# Patient Record
Sex: FEMALE | HISPANIC OR LATINO
[De-identification: names, ages, dates, MRNs, and addresses within clinical notes are randomized per-mention and may not be internally consistent; named-entity substitution may affect disease eponyms.]

---

## 2017-12-31 PROBLEM — Z00.00 ENCOUNTER FOR PREVENTIVE HEALTH EXAMINATION: Status: ACTIVE | Noted: 2017-12-31

## 2018-01-30 ENCOUNTER — APPOINTMENT (OUTPATIENT)
Dept: ENDOCRINOLOGY | Facility: CLINIC | Age: 58
End: 2018-01-30
Payer: MEDICARE

## 2018-01-30 VITALS
OXYGEN SATURATION: 98 % | WEIGHT: 150 LBS | DIASTOLIC BLOOD PRESSURE: 80 MMHG | SYSTOLIC BLOOD PRESSURE: 120 MMHG | BODY MASS INDEX: 30.24 KG/M2 | RESPIRATION RATE: 16 BRPM | HEART RATE: 85 BPM | HEIGHT: 59 IN

## 2018-01-30 DIAGNOSIS — Z78.9 OTHER SPECIFIED HEALTH STATUS: ICD-10-CM

## 2018-01-30 DIAGNOSIS — Z83.3 FAMILY HISTORY OF DIABETES MELLITUS: ICD-10-CM

## 2018-01-30 PROCEDURE — 99214 OFFICE O/P EST MOD 30 MIN: CPT

## 2018-03-28 ENCOUNTER — APPOINTMENT (OUTPATIENT)
Dept: ENDOCRINOLOGY | Facility: CLINIC | Age: 58
End: 2018-03-28
Payer: MEDICARE

## 2018-03-28 VITALS
BODY MASS INDEX: 31.45 KG/M2 | TEMPERATURE: 98.1 F | SYSTOLIC BLOOD PRESSURE: 122 MMHG | DIASTOLIC BLOOD PRESSURE: 60 MMHG | HEIGHT: 59 IN | HEART RATE: 84 BPM | WEIGHT: 156 LBS | RESPIRATION RATE: 16 BRPM | OXYGEN SATURATION: 97 %

## 2018-03-28 LAB — GLUCOSE BLDC GLUCOMTR-MCNC: 202

## 2018-03-28 PROCEDURE — 99214 OFFICE O/P EST MOD 30 MIN: CPT | Mod: 25

## 2018-03-28 PROCEDURE — 82962 GLUCOSE BLOOD TEST: CPT

## 2018-03-30 ENCOUNTER — RX RENEWAL (OUTPATIENT)
Age: 58
End: 2018-03-30

## 2018-04-16 ENCOUNTER — RX RENEWAL (OUTPATIENT)
Age: 58
End: 2018-04-16

## 2018-06-19 ENCOUNTER — APPOINTMENT (OUTPATIENT)
Dept: ENDOCRINOLOGY | Facility: CLINIC | Age: 58
End: 2018-06-19
Payer: MEDICARE

## 2018-06-19 VITALS
BODY MASS INDEX: 29.23 KG/M2 | SYSTOLIC BLOOD PRESSURE: 118 MMHG | DIASTOLIC BLOOD PRESSURE: 72 MMHG | WEIGHT: 145 LBS | HEIGHT: 59 IN | OXYGEN SATURATION: 98 % | HEART RATE: 84 BPM | RESPIRATION RATE: 16 BRPM

## 2018-06-19 LAB — GLUCOSE BLDC GLUCOMTR-MCNC: 102

## 2018-06-19 PROCEDURE — 99214 OFFICE O/P EST MOD 30 MIN: CPT | Mod: 25

## 2018-06-19 PROCEDURE — 82962 GLUCOSE BLOOD TEST: CPT

## 2018-09-18 ENCOUNTER — APPOINTMENT (OUTPATIENT)
Dept: ENDOCRINOLOGY | Facility: CLINIC | Age: 58
End: 2018-09-18
Payer: MEDICARE

## 2018-09-18 VITALS
TEMPERATURE: 98.8 F | DIASTOLIC BLOOD PRESSURE: 68 MMHG | SYSTOLIC BLOOD PRESSURE: 105 MMHG | RESPIRATION RATE: 16 BRPM | WEIGHT: 150 LBS | BODY MASS INDEX: 30.24 KG/M2 | HEART RATE: 73 BPM | OXYGEN SATURATION: 98 % | HEIGHT: 59 IN

## 2018-09-18 LAB — GLUCOSE BLDC GLUCOMTR-MCNC: 134

## 2018-09-18 PROCEDURE — 99214 OFFICE O/P EST MOD 30 MIN: CPT | Mod: 25

## 2018-09-18 PROCEDURE — 82962 GLUCOSE BLOOD TEST: CPT

## 2018-09-18 RX ORDER — INSULIN GLARGINE 100 [IU]/ML
100 INJECTION, SOLUTION SUBCUTANEOUS
Qty: 6 | Refills: 3 | Status: COMPLETED | COMMUNITY
End: 2018-09-18

## 2018-09-18 RX ORDER — DEXAMETHASONE 1 MG/1
1 TABLET ORAL
Qty: 1 | Refills: 1 | Status: COMPLETED | COMMUNITY
Start: 2018-03-28 | End: 2018-09-18

## 2018-12-21 ENCOUNTER — APPOINTMENT (OUTPATIENT)
Dept: ENDOCRINOLOGY | Facility: CLINIC | Age: 58
End: 2018-12-21
Payer: MEDICARE

## 2018-12-21 VITALS
SYSTOLIC BLOOD PRESSURE: 104 MMHG | RESPIRATION RATE: 16 BRPM | DIASTOLIC BLOOD PRESSURE: 64 MMHG | WEIGHT: 148 LBS | HEART RATE: 89 BPM | BODY MASS INDEX: 29.84 KG/M2 | HEIGHT: 59 IN | TEMPERATURE: 97.9 F | OXYGEN SATURATION: 96 %

## 2018-12-21 LAB — GLUCOSE BLDC GLUCOMTR-MCNC: 93

## 2018-12-21 PROCEDURE — 82962 GLUCOSE BLOOD TEST: CPT

## 2018-12-21 PROCEDURE — 99214 OFFICE O/P EST MOD 30 MIN: CPT | Mod: 25

## 2019-01-28 ENCOUNTER — RX RENEWAL (OUTPATIENT)
Age: 59
End: 2019-01-28

## 2019-01-28 RX ORDER — CANAGLIFLOZIN 300 MG/1
300 TABLET, FILM COATED ORAL
Qty: 90 | Refills: 3 | Status: COMPLETED | COMMUNITY
End: 2019-01-28

## 2019-04-23 ENCOUNTER — APPOINTMENT (OUTPATIENT)
Dept: ENDOCRINOLOGY | Facility: CLINIC | Age: 59
End: 2019-04-23
Payer: MEDICARE

## 2019-04-23 VITALS
BODY MASS INDEX: 30.04 KG/M2 | HEART RATE: 78 BPM | TEMPERATURE: 98.3 F | HEIGHT: 59 IN | RESPIRATION RATE: 16 BRPM | WEIGHT: 149 LBS | DIASTOLIC BLOOD PRESSURE: 66 MMHG | SYSTOLIC BLOOD PRESSURE: 106 MMHG | OXYGEN SATURATION: 96 %

## 2019-04-23 LAB — GLUCOSE BLDC GLUCOMTR-MCNC: 168

## 2019-04-23 PROCEDURE — 82962 GLUCOSE BLOOD TEST: CPT

## 2019-04-23 PROCEDURE — 99214 OFFICE O/P EST MOD 30 MIN: CPT | Mod: 25

## 2019-04-23 NOTE — DATA REVIEWED
[FreeTextEntry1] : The FBS and HbA1c indicates improved control. The renal function is fine. The LDL-C is good

## 2019-04-23 NOTE — PHYSICAL EXAM
[Alert] : alert [No Acute Distress] : no acute distress [Normal Sclera/Conjunctiva] : normal sclera/conjunctiva [PERRL] : pupils equal, round and reactive to light [Normal Outer Ear/Nose] : the ears and nose were normal in appearance [Normal Hearing] : hearing was normal [No Neck Mass] : no neck mass was observed [Thyroid Not Enlarged] : the thyroid was not enlarged [No Respiratory Distress] : no respiratory distress [Normal Rate and Effort] : normal respiratory rhythm and effort [Normal PMI] : the apical impulse was normal [Normal Rate] : heart rate was normal  [Carotids Normal] : carotid pulses were normal with no bruits [Pedal Pulses Normal] : the pedal pulses are present [No Stigmata of Cushings Syndrome] : no stigmata of cushings syndrome [No Rash] : no rash [No Skin Lesions] : no skin lesions [No Motor Deficits] : the motor exam was normal [No Sensory Deficits] : the sensory exam was normal to light touch and pinprick [Normal Sensation on Monofilament Testing] : normal sensation on monofilament testing of lower extremities

## 2019-04-23 NOTE — HISTORY OF PRESENT ILLNESS
[FreeTextEntry1] : Patient feels well. Denies polydipsia and polyuria. Denies chest pain, SOB, ankle edema. Patient's weight is unchanged. She denies numbness and tingling sensation on extremities. Her blood sugars at home are fine . They range from 90 to 120 mg/dl. At the lab the FBS was 134 mg/dl and the HbA1c 7.7 %, the HbA1c has improved from 8%. Following the diet and/or exercising properly. She has seen seen  the Ophthalmologist recently, not see the Podiatrist recently or the Cardiologist recently\par

## 2019-04-23 NOTE — ASSESSMENT
[FreeTextEntry1] : The diabetic control is improving\par Advised to continue following the diet and exercise\par Will continue the same treatment\par Informed that the HbA1c should be below 7%.\par The hyperlipidemia is well controlled except for the HDL-C that is low

## 2019-08-20 ENCOUNTER — APPOINTMENT (OUTPATIENT)
Dept: ENDOCRINOLOGY | Facility: CLINIC | Age: 59
End: 2019-08-20
Payer: MEDICARE

## 2019-08-20 VITALS
HEART RATE: 89 BPM | OXYGEN SATURATION: 97 % | WEIGHT: 150 LBS | BODY MASS INDEX: 30.24 KG/M2 | HEIGHT: 59 IN | TEMPERATURE: 97.9 F | DIASTOLIC BLOOD PRESSURE: 66 MMHG | RESPIRATION RATE: 16 BRPM | SYSTOLIC BLOOD PRESSURE: 107 MMHG

## 2019-08-20 LAB — GLUCOSE BLDC GLUCOMTR-MCNC: 76

## 2019-08-20 PROCEDURE — G0108 DIAB MANAGE TRN  PER INDIV: CPT

## 2019-08-20 PROCEDURE — 99214 OFFICE O/P EST MOD 30 MIN: CPT | Mod: 25

## 2019-08-20 PROCEDURE — 82962 GLUCOSE BLOOD TEST: CPT

## 2019-08-20 NOTE — HISTORY OF PRESENT ILLNESS
[FreeTextEntry1] : The patient feels well but she is under stress at home. She is unable to lose weight. The HbA1c has increased. The renal function is fine. Denies chest pain or palpitations or SOB. No urinary symptoms. She has seen the eye doctors, not seen the Podiatrist yet.

## 2019-08-20 NOTE — ASSESSMENT
[FreeTextEntry1] : The diabetic control has deteriorated\par She could not see the Nutritionist because she does not take her insurance.\par Advised to follow the diet and exercise\par She is already taking the best medications available\par To see Nutritionist in the second floor

## 2019-08-20 NOTE — PHYSICAL EXAM
[Alert] : alert [No Acute Distress] : no acute distress [Normal Sclera/Conjunctiva] : normal sclera/conjunctiva [PERRL] : pupils equal, round and reactive to light [Normal Outer Ear/Nose] : the ears and nose were normal in appearance [Normal Hearing] : hearing was normal [No Neck Mass] : no neck mass was observed [Thyroid Not Enlarged] : the thyroid was not enlarged [No Respiratory Distress] : no respiratory distress [Normal Rate and Effort] : normal respiratory rhythm and effort [Normal Rate] : heart rate was normal  [Normal PMI] : the apical impulse was normal [Carotids Normal] : carotid pulses were normal with no bruits [Pedal Pulses Normal] : the pedal pulses are present [No Stigmata of Cushings Syndrome] : no stigmata of cushings syndrome [No Rash] : no rash [No Skin Lesions] : no skin lesions [No Motor Deficits] : the motor exam was normal [No Sensory Deficits] : the sensory exam was normal to light touch and pinprick [Normal Sensation on Monofilament Testing] : normal sensation on monofilament testing of lower extremities

## 2019-12-13 ENCOUNTER — APPOINTMENT (OUTPATIENT)
Dept: ENDOCRINOLOGY | Facility: CLINIC | Age: 59
End: 2019-12-13
Payer: MEDICARE

## 2019-12-13 VITALS
HEIGHT: 59 IN | RESPIRATION RATE: 16 BRPM | OXYGEN SATURATION: 97 % | HEART RATE: 82 BPM | DIASTOLIC BLOOD PRESSURE: 59 MMHG | TEMPERATURE: 98.3 F | WEIGHT: 154 LBS | SYSTOLIC BLOOD PRESSURE: 104 MMHG | BODY MASS INDEX: 31.04 KG/M2

## 2019-12-13 LAB — GLUCOSE BLDC GLUCOMTR-MCNC: 124

## 2019-12-13 PROCEDURE — 82962 GLUCOSE BLOOD TEST: CPT

## 2019-12-13 PROCEDURE — 99214 OFFICE O/P EST MOD 30 MIN: CPT | Mod: 25

## 2019-12-13 NOTE — HISTORY OF PRESENT ILLNESS
[FreeTextEntry1] : Patient feels well , she is asymptomatic. Her weight has increased. She is exercising regularly and following the diet. Her blood glucose at home are well controlled, they are usually around 100 mg/dl. The FBS in the laboratory was 115 mg/dl and the HbA1c was 7.5 %, improved since last visit. The renal function is within normal limits, the microalbumin in the urine was normal. The lipid panel was within normal limits. She denies low blood glucose during the night. She denies chest pain, or SOB. Denies numbness, tingling or burning sensation on her extremities. Taking her medications regularly. She has seen the Ophthalmologist recently. She has not seen Podiatrist recently. She has not seen the Cardiologist recently, she saw him 2018.\par

## 2019-12-13 NOTE — ASSESSMENT
[FreeTextEntry1] : The diabetes has improved\par The patient is not losing weight\par Advised to follow a low CHO, low fat diet\par Advised to exercisexercise\par The renal function is fine\par Will continue the same treatment\par

## 2019-12-13 NOTE — DATA REVIEWED
[FreeTextEntry1] : The FBS and HbA1c have improved. The renal function is fine. The lipid panel is fine except for the HDL-C that is low

## 2019-12-13 NOTE — PHYSICAL EXAM
[Alert] : alert [No Acute Distress] : no acute distress [Normal Sclera/Conjunctiva] : normal sclera/conjunctiva [PERRL] : pupils equal, round and reactive to light [Normal Outer Ear/Nose] : the ears and nose were normal in appearance [No Neck Mass] : no neck mass was observed [Normal Hearing] : hearing was normal [Thyroid Not Enlarged] : the thyroid was not enlarged [No Respiratory Distress] : no respiratory distress [Normal Rate and Effort] : normal respiratory rhythm and effort [Normal Rate] : heart rate was normal  [Normal PMI] : the apical impulse was normal [Pedal Pulses Normal] : the pedal pulses are present [Carotids Normal] : carotid pulses were normal with no bruits [No Stigmata of Cushings Syndrome] : no stigmata of cushings syndrome [No Skin Lesions] : no skin lesions [No Rash] : no rash [No Sensory Deficits] : the sensory exam was normal to light touch and pinprick [No Motor Deficits] : the motor exam was normal [Normal Sensation on Monofilament Testing] : normal sensation on monofilament testing of lower extremities

## 2020-04-27 ENCOUNTER — APPOINTMENT (OUTPATIENT)
Dept: ENDOCRINOLOGY | Facility: CLINIC | Age: 60
End: 2020-04-27

## 2020-08-25 ENCOUNTER — APPOINTMENT (OUTPATIENT)
Dept: ENDOCRINOLOGY | Facility: CLINIC | Age: 60
End: 2020-08-25

## 2020-10-25 ENCOUNTER — RX RENEWAL (OUTPATIENT)
Age: 60
End: 2020-10-25

## 2021-03-24 ENCOUNTER — APPOINTMENT (OUTPATIENT)
Dept: ENDOCRINOLOGY | Facility: CLINIC | Age: 61
End: 2021-03-24
Payer: MEDICARE

## 2021-03-24 VITALS
SYSTOLIC BLOOD PRESSURE: 115 MMHG | HEIGHT: 59 IN | TEMPERATURE: 98 F | BODY MASS INDEX: 31.04 KG/M2 | OXYGEN SATURATION: 97 % | DIASTOLIC BLOOD PRESSURE: 67 MMHG | WEIGHT: 154 LBS | RESPIRATION RATE: 16 BRPM | HEART RATE: 81 BPM

## 2021-03-24 LAB
GLUCOSE BLDC GLUCOMTR-MCNC: 138
HBA1C MFR BLD HPLC: 7.8

## 2021-03-24 PROCEDURE — 82962 GLUCOSE BLOOD TEST: CPT

## 2021-03-24 PROCEDURE — 83036 HEMOGLOBIN GLYCOSYLATED A1C: CPT | Mod: QW

## 2021-03-24 PROCEDURE — 99214 OFFICE O/P EST MOD 30 MIN: CPT

## 2021-03-24 PROCEDURE — 99072 ADDL SUPL MATRL&STAF TM PHE: CPT

## 2021-03-24 RX ORDER — EMPAGLIFLOZIN 25 MG/1
25 TABLET, FILM COATED ORAL DAILY
Qty: 90 | Refills: 3 | Status: COMPLETED | COMMUNITY
Start: 2019-01-28 | End: 2021-03-24

## 2021-03-24 NOTE — HISTORY OF PRESENT ILLNESS
[FreeTextEntry1] : Patient feels well , she is asymptomatic. Her weight has not changed. She is exercising regularly and following the diet. Her blood glucose at home are well controlled, they are usually around 105 mg/dl. She denies low blood glucose during the night. She denies chest pain, or SOB. Denies numbness, tingling or burning sensation on her extremities. Taking her medications regularly. She has seen the Ophthalmologist recently. She has seen Podiatrist recently. She has not seen the Cardiologist recently. Her PCP stopped the Jardiance because of vaginitis and the Victoza due to the Diabetic retinopathy. The medication were stopped 2 months ago. She had blood tests with Dr. Rhodes but her phone is busy all the time.\par

## 2021-03-24 NOTE — ASSESSMENT
[FreeTextEntry1] : The diabetes seems to be well controlled\par The HbA1c was 7.8%\par Patient was slightly hypoglycemic, she was give apple juice\par Before the patient left the BS was 137 mg/dl\par No recent blood test\par Will alfaro the blood tests today\par She will call me next week for results

## 2021-03-25 LAB
ALBUMIN SERPL ELPH-MCNC: 4.5 G/DL
ALP BLD-CCNC: 62 U/L
ALT SERPL-CCNC: 11 U/L
ANION GAP SERPL CALC-SCNC: 11 MMOL/L
AST SERPL-CCNC: 19 U/L
BILIRUB DIRECT SERPL-MCNC: 0.1 MG/DL
BILIRUB INDIRECT SERPL-MCNC: 0.2 MG/DL
BILIRUB SERPL-MCNC: 0.3 MG/DL
BUN SERPL-MCNC: 12 MG/DL
CALCIUM SERPL-MCNC: 9.7 MG/DL
CHLORIDE SERPL-SCNC: 101 MMOL/L
CHOLEST SERPL-MCNC: 116 MG/DL
CO2 SERPL-SCNC: 28 MMOL/L
CREAT SERPL-MCNC: 0.61 MG/DL
CREAT SPEC-SCNC: 224 MG/DL
ESTIMATED AVERAGE GLUCOSE: 194 MG/DL
FRUCTOSAMINE SERPL-MCNC: 304 UMOL/L
GLUCOSE BLDC GLUCOMTR-MCNC: 71
GLUCOSE BS SERPL-MCNC: 155 MG/DL
GLUCOSE SERPL-MCNC: 163 MG/DL
HBA1C MFR BLD HPLC: 8.4 %
HDLC SERPL-MCNC: 51 MG/DL
LDLC SERPL CALC-MCNC: 42 MG/DL
MICROALBUMIN 24H UR DL<=1MG/L-MCNC: <1.2 MG/DL
MICROALBUMIN/CREAT 24H UR-RTO: NORMAL MG/G
NONHDLC SERPL-MCNC: 65 MG/DL
POTASSIUM SERPL-SCNC: 4.9 MMOL/L
PROT SERPL-MCNC: 6.6 G/DL
SODIUM SERPL-SCNC: 140 MMOL/L
T4 FREE SERPL-MCNC: 1.3 NG/DL
TRIGL SERPL-MCNC: 114 MG/DL
TSH SERPL-ACNC: 1.1 UIU/ML

## 2021-06-29 ENCOUNTER — APPOINTMENT (OUTPATIENT)
Dept: ENDOCRINOLOGY | Facility: CLINIC | Age: 61
End: 2021-06-29
Payer: MEDICARE

## 2021-06-29 VITALS
HEART RATE: 73 BPM | TEMPERATURE: 98.2 F | RESPIRATION RATE: 16 BRPM | HEIGHT: 59 IN | OXYGEN SATURATION: 97 % | SYSTOLIC BLOOD PRESSURE: 120 MMHG | WEIGHT: 158 LBS | BODY MASS INDEX: 31.85 KG/M2 | DIASTOLIC BLOOD PRESSURE: 67 MMHG

## 2021-06-29 LAB — GLUCOSE BLDC GLUCOMTR-MCNC: 139

## 2021-06-29 PROCEDURE — 82962 GLUCOSE BLOOD TEST: CPT

## 2021-06-29 PROCEDURE — 99214 OFFICE O/P EST MOD 30 MIN: CPT | Mod: 25

## 2021-06-29 RX ORDER — LINAGLIPTIN 5 MG/1
5 TABLET, FILM COATED ORAL
Qty: 90 | Refills: 3 | Status: COMPLETED | COMMUNITY
Start: 2021-03-25 | End: 2021-06-29

## 2021-06-29 NOTE — DATA REVIEWED
[FreeTextEntry1] : The FBS was fine but the HbA1c remains high. The renal function is fine. The thyroid tests are normal. The lipid panel was fine.

## 2021-06-29 NOTE — HISTORY OF PRESENT ILLNESS
[FreeTextEntry1] : Patient feels well, her FBS was fine but the hbA1c remains elevated. She has gained weight. Her renal function is fine. She is not following the diet very well, not walking too much.

## 2021-06-29 NOTE — ASSESSMENT
[FreeTextEntry1] : Poor diabetic control\par She has been on Tradjenta for 2 months\par She has frequent UTI \par Will start Trulicity weekly\par Advised about the side effects\par Will discontinue Tradjenta

## 2021-07-08 ENCOUNTER — APPOINTMENT (OUTPATIENT)
Dept: ENDOCRINOLOGY | Facility: CLINIC | Age: 61
End: 2021-07-08

## 2021-10-01 ENCOUNTER — APPOINTMENT (OUTPATIENT)
Dept: ENDOCRINOLOGY | Facility: CLINIC | Age: 61
End: 2021-10-01
Payer: MEDICARE

## 2021-10-01 VITALS
DIASTOLIC BLOOD PRESSURE: 66 MMHG | BODY MASS INDEX: 32.46 KG/M2 | OXYGEN SATURATION: 99 % | SYSTOLIC BLOOD PRESSURE: 109 MMHG | HEIGHT: 59 IN | TEMPERATURE: 97.9 F | HEART RATE: 83 BPM | WEIGHT: 161 LBS | RESPIRATION RATE: 16 BRPM

## 2021-10-01 LAB
GLUCOSE BLDC GLUCOMTR-MCNC: 102
HBA1C MFR BLD HPLC: 8.1

## 2021-10-01 PROCEDURE — 83036 HEMOGLOBIN GLYCOSYLATED A1C: CPT | Mod: QW

## 2021-10-01 PROCEDURE — 99214 OFFICE O/P EST MOD 30 MIN: CPT | Mod: 25

## 2021-10-01 PROCEDURE — 82962 GLUCOSE BLOOD TEST: CPT

## 2021-10-01 RX ORDER — ISOPROPYL ALCOHOL 70 ML/100ML
70 SWAB TOPICAL
Qty: 400 | Refills: 3 | Status: ACTIVE | COMMUNITY
Start: 2020-10-25 | End: 1900-01-01

## 2021-10-01 NOTE — HISTORY OF PRESENT ILLNESS
[FreeTextEntry1] : Patient feels well , she is asymptomatic. Her weight has increased. She is exercising regularly and following the diet. Her blood glucose at home are well controlled, they are usually around 100 to 130 mg/dl. The FBS in the laboratory was 116 mg/dl and the HbA1c was 7.9 %, improved since last visit. The renal function is within normal limits, the microalbumin in the urine was normal. The lipid panel was within normal limits. She denies low blood glucose during the night. She denies chest pain, or SOB. Denies numbness, tingling or burning sensation on her extremities. Taking her medications regularly. She has seen the Ophthalmologist recently. She has seen Podiatrist recently. She has seen the Cardiologist recently.\par

## 2021-10-01 NOTE — REASON FOR VISIT
Quality 130: Documentation Of Current Medications In The Medical Record: Current Medications Documented Detail Level: Detailed Quality 226: Preventive Care And Screening: Tobacco Use: Screening And Cessation Intervention: Patient screened for tobacco use and is an ex/non-smoker [Follow - Up] : a follow-up visit [DM Type 2] : DM Type 2 [Other___] : [unfilled]

## 2021-10-01 NOTE — ASSESSMENT
[FreeTextEntry1] : The diabetic control has improved\par The HbA1c still elevated\par She has gained weight\par The LDL-C and Tg were fine\par Will increase the Trulicity dose weekly\par Advised to call me if she has if any side effects\par

## 2021-10-01 NOTE — DATA REVIEWED
[FreeTextEntry1] : The FBS and hbA1c are improved. The renal function is fine. The LDL-C is fine but the HDL-C is low.

## 2021-12-22 ENCOUNTER — RX RENEWAL (OUTPATIENT)
Age: 61
End: 2021-12-22

## 2022-02-02 ENCOUNTER — APPOINTMENT (OUTPATIENT)
Dept: ENDOCRINOLOGY | Facility: CLINIC | Age: 62
End: 2022-02-02
Payer: MEDICARE

## 2022-02-02 DIAGNOSIS — E11.319 TYPE 2 DIABETES MELLITUS WITH UNSPECIFIED DIABETIC RETINOPATHY W/OUT MACULAR EDEMA: ICD-10-CM

## 2022-02-02 DIAGNOSIS — E78.5 HYPERLIPIDEMIA, UNSPECIFIED: ICD-10-CM

## 2022-02-02 DIAGNOSIS — E11.9 TYPE 2 DIABETES MELLITUS W/OUT COMPLICATIONS: ICD-10-CM

## 2022-02-02 PROCEDURE — 99443: CPT

## 2022-02-02 RX ORDER — ALCOHOL ANTISEPTIC PADS
PADS, MEDICATED (EA) TOPICAL
Qty: 400 | Refills: 3 | Status: ACTIVE | COMMUNITY
Start: 2018-04-16 | End: 1900-01-01

## 2022-02-02 RX ORDER — LIRAGLUTIDE 6 MG/ML
18 INJECTION SUBCUTANEOUS DAILY
Qty: 9 | Refills: 3 | Status: COMPLETED | COMMUNITY
Start: 2018-06-19 | End: 2022-02-02

## 2022-02-02 RX ORDER — LISINOPRIL 2.5 MG/1
2.5 TABLET ORAL
Qty: 90 | Refills: 3 | Status: ACTIVE | COMMUNITY
Start: 1900-01-01 | End: 1900-01-01

## 2022-02-02 RX ORDER — PEN NEEDLE, DIABETIC 29 G X1/2"
31G X 5 MM NEEDLE, DISPOSABLE MISCELLANEOUS
Qty: 400 | Refills: 4 | Status: ACTIVE | COMMUNITY
Start: 2018-04-16 | End: 1900-01-01

## 2022-02-02 RX ORDER — SIMVASTATIN 40 MG/1
40 TABLET, FILM COATED ORAL
Qty: 90 | Refills: 3 | Status: ACTIVE | COMMUNITY
Start: 1900-01-01 | End: 1900-01-01

## 2022-02-02 RX ORDER — ISOPROPYL ALCOHOL 70 ML/100ML
SWAB TOPICAL
Qty: 400 | Refills: 3 | Status: ACTIVE | COMMUNITY
Start: 2018-04-16 | End: 1900-01-01

## 2022-02-02 RX ORDER — METFORMIN HYDROCHLORIDE 1000 MG/1
1000 TABLET, COATED ORAL
Qty: 180 | Refills: 3 | Status: ACTIVE | COMMUNITY
Start: 2018-09-18 | End: 1900-01-01

## 2022-02-02 RX ORDER — DULAGLUTIDE 1.5 MG/.5ML
1.5 INJECTION, SOLUTION SUBCUTANEOUS
Qty: 12 | Refills: 3 | Status: ACTIVE | COMMUNITY
Start: 2021-06-29 | End: 1900-01-01

## 2022-02-02 NOTE — HISTORY OF PRESENT ILLNESS
[Home] : at home, [unfilled] , at the time of the visit. [Medical Office: ([facility])___] : at the medical office located in  [Verbal consent obtained from patient] : the patient, [unfilled] [FreeTextEntry1] : Patient feels well , she is asymptomatic. Her weight has not changed. She is walking regularly and following the diet. Her blood glucose at home are well controlled, they are usually around 120 mg/dl. The FBS in the laboratory was 106 mg/dl and the HbA1c was 8.3 %, increased since last visit. The renal function is within normal limits, the microalbumin in the urine was normal. The lipid panel was within normal limits except for the low HDL-C. She denies low blood glucose during the night. She denies chest pain, or SOB. Denies numbness, tingling or burning sensation on her extremities. Taking her medications regularly. She has seen the Ophthalmologist recently. She has seen Podiatrist recently. She has not seen the Cardiologist recently.\par

## 2022-02-02 NOTE — DATA REVIEWED
[FreeTextEntry1] : The FBS was fine but the HbA1c was slightly higher. The renal function was fine. The HDL-C was low.

## 2022-02-10 ENCOUNTER — NON-APPOINTMENT (OUTPATIENT)
Age: 62
End: 2022-02-10

## 2022-02-10 RX ORDER — INSULIN LISPRO 100 [IU]/ML
100 INJECTION, SOLUTION INTRAVENOUS; SUBCUTANEOUS
Qty: 1 | Refills: 3 | Status: COMPLETED | COMMUNITY
Start: 2022-02-04 | End: 2022-02-10

## 2022-02-10 RX ORDER — BLOOD-GLUCOSE METER
W/DEVICE EACH MISCELLANEOUS
Qty: 1 | Refills: 0 | Status: ACTIVE | COMMUNITY
Start: 2022-02-08 | End: 1900-01-01

## 2022-02-10 RX ORDER — INSULIN ASPART 100 [IU]/ML
100 INJECTION, SOLUTION INTRAVENOUS; SUBCUTANEOUS
Qty: 8 | Refills: 0 | Status: COMPLETED | COMMUNITY
End: 2022-02-10

## 2022-02-24 RX ORDER — INSULIN LISPRO 100 [IU]/ML
100 INJECTION, SOLUTION INTRAVENOUS; SUBCUTANEOUS
Qty: 10 | Refills: 3 | Status: COMPLETED | COMMUNITY
Start: 2022-02-10 | End: 2022-02-24

## 2022-03-21 RX ORDER — INSULIN ASPART 100 [IU]/ML
100 INJECTION, SOLUTION INTRAVENOUS; SUBCUTANEOUS
Qty: 16 | Refills: 3 | Status: ACTIVE | COMMUNITY
Start: 2022-02-24 | End: 1900-01-01

## 2022-03-21 RX ORDER — GABAPENTIN 100 MG/1
100 CAPSULE ORAL 3 TIMES DAILY
Qty: 270 | Refills: 3 | Status: ACTIVE | COMMUNITY
Start: 2022-03-21 | End: 1900-01-01

## 2022-03-21 RX ORDER — INSULIN GLARGINE 100 [IU]/ML
100 INJECTION, SOLUTION SUBCUTANEOUS
Qty: 16 | Refills: 3 | Status: ACTIVE | COMMUNITY
Start: 2018-03-30 | End: 1900-01-01